# Patient Record
Sex: FEMALE | URBAN - METROPOLITAN AREA
[De-identification: names, ages, dates, MRNs, and addresses within clinical notes are randomized per-mention and may not be internally consistent; named-entity substitution may affect disease eponyms.]

---

## 2022-05-31 ENCOUNTER — APPOINTMENT (OUTPATIENT)
Dept: URBAN - METROPOLITAN AREA CLINIC 260 | Age: 67
Setting detail: DERMATOLOGY
End: 2022-06-01

## 2022-05-31 VITALS — WEIGHT: 180 LBS | HEIGHT: 67 IN

## 2022-05-31 DIAGNOSIS — L82.1 OTHER SEBORRHEIC KERATOSIS: ICD-10-CM

## 2022-05-31 DIAGNOSIS — D18.0 HEMANGIOMA: ICD-10-CM

## 2022-05-31 PROBLEM — D18.01 HEMANGIOMA OF SKIN AND SUBCUTANEOUS TISSUE: Status: ACTIVE | Noted: 2022-05-31

## 2022-05-31 PROCEDURE — 99202 OFFICE O/P NEW SF 15 MIN: CPT

## 2022-05-31 PROCEDURE — OTHER MIPS QUALITY: OTHER

## 2022-05-31 PROCEDURE — OTHER COUNSELING: OTHER

## 2022-05-31 ASSESSMENT — LOCATION SIMPLE DESCRIPTION DERM
LOCATION SIMPLE: RIGHT ANTERIOR NECK
LOCATION SIMPLE: LEFT FOREARM

## 2022-05-31 ASSESSMENT — LOCATION ZONE DERM
LOCATION ZONE: ARM
LOCATION ZONE: NECK

## 2022-05-31 ASSESSMENT — LOCATION DETAILED DESCRIPTION DERM
LOCATION DETAILED: LEFT VENTRAL PROXIMAL FOREARM
LOCATION DETAILED: RIGHT SUPERIOR LATERAL NECK

## 2022-07-12 ENCOUNTER — APPOINTMENT (OUTPATIENT)
Dept: URBAN - METROPOLITAN AREA CLINIC 260 | Age: 67
Setting detail: DERMATOLOGY
End: 2022-07-12

## 2022-07-12 VITALS — HEIGHT: 67 IN | WEIGHT: 180 LBS

## 2022-07-12 DIAGNOSIS — L40.0 PSORIASIS VULGARIS: ICD-10-CM

## 2022-07-12 DIAGNOSIS — L82.1 OTHER SEBORRHEIC KERATOSIS: ICD-10-CM

## 2022-07-12 DIAGNOSIS — D22 MELANOCYTIC NEVI: ICD-10-CM

## 2022-07-12 DIAGNOSIS — L81.4 OTHER MELANIN HYPERPIGMENTATION: ICD-10-CM

## 2022-07-12 DIAGNOSIS — D18.0 HEMANGIOMA: ICD-10-CM

## 2022-07-12 DIAGNOSIS — L72.8 OTHER FOLLICULAR CYSTS OF THE SKIN AND SUBCUTANEOUS TISSUE: ICD-10-CM

## 2022-07-12 DIAGNOSIS — Z71.89 OTHER SPECIFIED COUNSELING: ICD-10-CM

## 2022-07-12 PROBLEM — D18.01 HEMANGIOMA OF SKIN AND SUBCUTANEOUS TISSUE: Status: ACTIVE | Noted: 2022-07-12

## 2022-07-12 PROBLEM — D22.5 MELANOCYTIC NEVI OF TRUNK: Status: ACTIVE | Noted: 2022-07-12

## 2022-07-12 PROCEDURE — OTHER ADDITIONAL NOTES: OTHER

## 2022-07-12 PROCEDURE — 99214 OFFICE O/P EST MOD 30 MIN: CPT

## 2022-07-12 PROCEDURE — OTHER MIPS QUALITY: OTHER

## 2022-07-12 PROCEDURE — OTHER PRESCRIPTION MEDICATION MANAGEMENT: OTHER

## 2022-07-12 PROCEDURE — OTHER COUNSELING: OTHER

## 2022-07-12 ASSESSMENT — PGA PSORIASIS: PGA PSORIASIS 2020: MILD

## 2022-07-12 ASSESSMENT — LOCATION ZONE DERM
LOCATION ZONE: NECK
LOCATION ZONE: LEG
LOCATION ZONE: TRUNK

## 2022-07-12 ASSESSMENT — LOCATION SIMPLE DESCRIPTION DERM
LOCATION SIMPLE: RIGHT ANTERIOR NECK
LOCATION SIMPLE: RIGHT PRETIBIAL REGION
LOCATION SIMPLE: UPPER BACK
LOCATION SIMPLE: LEFT POPLITEAL SKIN

## 2022-07-12 ASSESSMENT — LOCATION DETAILED DESCRIPTION DERM
LOCATION DETAILED: LEFT POPLITEAL SKIN
LOCATION DETAILED: RIGHT PROXIMAL PRETIBIAL REGION
LOCATION DETAILED: RIGHT SUPERIOR LATERAL NECK
LOCATION DETAILED: INFERIOR THORACIC SPINE

## 2022-07-12 ASSESSMENT — BSA PSORIASIS: % BODY COVERED IN PSORIASIS: 6

## 2022-07-12 NOTE — PROCEDURE: PRESCRIPTION MEDICATION MANAGEMENT
Plan: Patient has small patches throughout her arms and legs that come and go. They mostly resolve with Triamcinolone. If she has any stubborn lesions, we could prescribe something stronger such as Clobetasol. Return if not improved.
Render In Strict Bullet Format?: No
Detail Level: Simple
Continue Regimen: Triamcinolone

## 2022-07-12 NOTE — HPI: FULL BODY SKIN EXAMINATION
How Severe Are Your Spot(S)?: moderate
What Type Of Note Output Would You Prefer (Optional)?: Bullet Format
What Is The Reason For Today's Visit?: Annual Full Body Skin Examination with No Concerns
What Is The Reason For Today's Visit? (Being Monitored For X): concerning skin lesions on an annual basis
Additional History: Taking remicaid for crohns for the past 7 years

## 2022-07-12 NOTE — PROCEDURE: ADDITIONAL NOTES
Additional Notes: If any lesion becomes bothersome it can be treated with freezing
Render Risk Assessment In Note?: no
Detail Level: Zone

## 2024-06-26 ENCOUNTER — TRANSFERRED RECORDS (OUTPATIENT)
Dept: HEALTH INFORMATION MANAGEMENT | Facility: CLINIC | Age: 69
End: 2024-06-26

## 2024-06-26 LAB
CHOLESTEROL (EXTERNAL): 166 MG/DL (ref 0–200)
CREATININE (EXTERNAL): 0.6 MG/DL (ref 0.7–1.4)
GLUCOSE (EXTERNAL): 104 MG/DL (ref 60–99)
HBA1C MFR BLD: 5.29 % (ref 4–5.6)
HDLC SERPL-MCNC: 83 MG/DL (ref 35–65)
LDL CHOLESTEROL CALCULATED (EXTERNAL): 4 MG/DL (ref 0–130)
POTASSIUM (EXTERNAL): 4.4 MMOL/L (ref 3.5–5.1)
TRIGLYCERIDES (EXTERNAL): 399 MG/DL (ref 0–200)

## 2024-08-15 ENCOUNTER — TRANSFERRED RECORDS (OUTPATIENT)
Dept: HEALTH INFORMATION MANAGEMENT | Facility: CLINIC | Age: 69
End: 2024-08-15
Payer: MEDICARE

## 2024-08-15 ENCOUNTER — MEDICAL CORRESPONDENCE (OUTPATIENT)
Dept: HEALTH INFORMATION MANAGEMENT | Facility: CLINIC | Age: 69
End: 2024-08-15
Payer: MEDICARE

## 2024-09-05 ENCOUNTER — ANCILLARY PROCEDURE (OUTPATIENT)
Dept: CARDIOLOGY | Facility: CLINIC | Age: 69
End: 2024-09-05
Attending: FAMILY MEDICINE
Payer: COMMERCIAL

## 2024-09-05 DIAGNOSIS — R01.1 HEART MURMUR: ICD-10-CM

## 2024-09-05 LAB — BI-PLANE LVEF ECHO: NORMAL

## 2024-09-06 ENCOUNTER — OFFICE VISIT (OUTPATIENT)
Dept: CARDIOLOGY | Facility: CLINIC | Age: 69
End: 2024-09-06
Payer: MEDICARE

## 2024-09-06 VITALS
DIASTOLIC BLOOD PRESSURE: 73 MMHG | WEIGHT: 193 LBS | SYSTOLIC BLOOD PRESSURE: 168 MMHG | HEIGHT: 67 IN | HEART RATE: 77 BPM | BODY MASS INDEX: 30.29 KG/M2 | RESPIRATION RATE: 16 BRPM

## 2024-09-06 DIAGNOSIS — I10 ESSENTIAL HYPERTENSION: ICD-10-CM

## 2024-09-06 DIAGNOSIS — E78.2 MIXED HYPERLIPIDEMIA: ICD-10-CM

## 2024-09-06 DIAGNOSIS — R01.1 HEART MURMUR: Primary | ICD-10-CM

## 2024-09-06 DIAGNOSIS — R06.09 DOE (DYSPNEA ON EXERTION): ICD-10-CM

## 2024-09-06 PROCEDURE — 99204 OFFICE O/P NEW MOD 45 MIN: CPT | Performed by: INTERNAL MEDICINE

## 2024-09-06 RX ORDER — LOSARTAN POTASSIUM 25 MG/1
12.5 TABLET ORAL DAILY
Qty: 30 TABLET | Refills: 3 | Status: SHIPPED | OUTPATIENT
Start: 2024-09-06

## 2024-09-06 RX ORDER — CALCIUM CARBONATE 500 MG/1
1 TABLET, CHEWABLE ORAL DAILY
COMMUNITY

## 2024-09-06 RX ORDER — ATORVASTATIN CALCIUM 20 MG/1
20 TABLET, FILM COATED ORAL DAILY
COMMUNITY

## 2024-09-06 NOTE — PROGRESS NOTES
Thank you, Dr. Anuj Levine, for asking the [facility] team to see Ms. Alexi Crow to follow-up on heart murmur, dyspnea on exertion.    Assessment/Recommendations   Assessment:    1.  Heart murmur, likely due to trace mitral, tricuspid and aortic insufficiency noted on recent echocardiogram.  At this point in time, no further treatment indicated.  I also told her this would not be a source of her symptoms of exertional dyspnea.  2.  Dyspnea on exertion, etiology unclear.  Could be due to poorly controlled hypertension as her blood pressure is quite elevated today despite maximal dose of metoprolol succinate.  Recommended adding a low-dose of vasodilator to see if this helps.  Also have recommended exercise stress study to evaluate for ischemia as another potential etiology.  The only other question would be chronotropic incompetence as she is on maximal dose of metoprolol succinate although her heart rates are not in the low 60s at today's visit.  3.  Mixed hyperlipidemia.  She is on atorvastatin 20 mg daily with her most recent lipid profile demonstrating an LDL of 4.  Her LDL does not need to be this low and I suggested she decrease her atorvastatin to 10 mg daily with plans to have repeat lipids done in 3 months.  Her triglycerides were markedly elevated although she informs me she was not fasting at that visit.  Told her to be certain she is fasting for the next lipid draw.  4.  Essential hypertension, inadequately controlled.  Will add vasodilator therapy to her regimen which could be titrated upwards if needed.    Plan:  1.  Decrease atorvastatin to 10 mg daily with plan for repeat lipids in 3 months through primary care  2.  Add losartan 12.5 mg daily to current dose of metoprolol succinate  3.  Pursue stress echo study with further recommendations to follow       History of Present Illness    Ms. Alexi Crow is a 68 year old female with history of essential hypertension,  "hyperlipidemia who recently underwent colonoscopy and was noted to have a heart murmur by anesthesiology.  This information was subsequently communicated to her primary care provider who has since ordered an echocardiogram demonstrating no hemodynamically significant valve disease.  She did report symptoms of exertional dyspnea when climbing a slight hill as she walks her dog.  Denies any clear associated chest discomfort although has had occasional episodes of chest discomfort which are not clearly activity related.  No history of orthopnea, PND or lower extremity edema.  Denies palpitations or lightheadedness.    ECG (personally reviewed): No ECG today.  Previous ECG in July demonstrated sinus rhythm with nonspecific ST abnormality in the lateral leads    Cardiac Imaging Studies (personally reviewed):    Procedure  Complete Echo Adult. Definity (NDC #09602-177) given intravenously. 3ml given  and 7ml wasted  Lot# [id number] Exp Date Feb 2025. Technically difficult study. There is no  comparison study available. No hemodynamically significant valvular  abnormalities on 2D or color flow imaging.  ______________________________________________________________________________  Interpretation Summary     The left ventricle is normal in size.  Biplane LVEF is 68%.  No regional wall motion abnormalities noted.  No hemodynamically significant valvular abnormalities on 2D or color flow  imaging.  There is no comparison study available.  ___________________________________     Physical Examination Review of Systems   BP (!) 168/73 (BP Location: Left arm, Patient Position: Sitting, Cuff Size: Adult Regular)   Pulse 77   Resp 16   Ht 1.702 m (5' 7\")   Wt 87.5 kg (193 lb)   BMI 30.23 kg/m    Body mass index is 30.23 kg/m .  Wt Readings from Last 3 Encounters:   09/06/24 87.5 kg (193 lb)   05/26/16 83.9 kg (185 lb)     General Appearance:   Awake, Alert, No acute distress.   HEENT:  No scleral icterus; the mucous membranes were " "pink and moist.   Neck: No cervical bruits or jugular venous distention    Chest: The spine was straight. The chest was symmetric.   Lungs:   Respirations unlabored; the lungs are clear to auscultation. No wheezing   Cardiovascular:   Regular rate and rhythm.  S1, S2 normal.  1/6 systolic ejection murmur heard at the left upper sternal border with 1/6 diastolic decrescendo murmur also noted.   Abdomen:  No organomegaly, masses, bruits, or tenderness. Bowels sounds are present   Extremities: No peripheral edema bilaterally   Skin: No xanthelasma. Warm, Dry.   Musculoskeletal: No tenderness.   Neurologic: Mood and affect are appropriate.    Enc Vitals  BP: (!) 168/73  Pulse: 77  Resp: 16  Weight: 87.5 kg (193 lb)  Height: 170.2 cm (5' 7\")                                         Medical History  Surgical History Family History Social History   Past Medical History:   Diagnosis Date    Hyperlipidemia     Hypertension     Past Surgical History:   Procedure Laterality Date    ARTHROSCOPY ANKLE      VAGINAL WOUND CLOSURE / REPAIR      Family History   Problem Relation Age of Onset    Coronary Artery Disease Mother     Coronary Artery Disease Father 61        CABG    Social History     Socioeconomic History    Marital status:      Spouse name: Not on file    Number of children: Not on file    Years of education: Not on file    Highest education level: Not on file   Occupational History    Not on file   Tobacco Use    Smoking status: Former     Current packs/day: 0.00     Types: Cigarettes     Quit date: 1973     Years since quittin.2    Smokeless tobacco: Not on file   Substance and Sexual Activity    Alcohol use: Yes     Alcohol/week: 1.0 standard drink of alcohol     Comment: Alcoholic Drinks/day: daily    Drug use: No    Sexual activity: Not on file   Other Topics Concern    Not on file   Social History Narrative    Not on file     Social Determinants of Health     Financial Resource Strain: Not on file "   Food Insecurity: Not on file   Transportation Needs: Not on file   Physical Activity: Not on file   Stress: Not on file   Social Connections: Not on file   Interpersonal Safety: Not on file   Housing Stability: Not on file          Medications  Allergies   Current Outpatient Medications   Medication Sig Dispense Refill    acetaminophen (TYLENOL) 500 MG tablet [ACETAMINOPHEN (TYLENOL) 500 MG TABLET] Take 1,000 mg by mouth every 6 (six) hours as needed for pain.      atorvastatin (LIPITOR) 20 MG tablet Take 20 mg by mouth daily.      calcium carbonate (TUMS) 500 MG chewable tablet Take 1 chew tab by mouth daily.      cetirizine (ZYRTEC) 10 MG tablet [CETIRIZINE (ZYRTEC) 10 MG TABLET] Take 10 mg by mouth daily as needed for allergies.      INFLIXIMAB (REMICADE IV) [INFLIXIMAB (REMICADE IV)] Infuse 400 mg into a venous catheter every 2 (two) months.      loperamide (IMODIUM A-D) 2 mg tablet [LOPERAMIDE (IMODIUM A-D) 2 MG TABLET] Take 2 mg by mouth 4 (four) times a day as needed for diarrhea.      losartan (COZAAR) 25 MG tablet Take 0.5 tablets (12.5 mg) by mouth daily. 30 tablet 3    metoprolol succinate (TOPROL-XL) 50 MG 24 hr tablet Take 200 mg by mouth every evening.      omeprazole (PRILOSEC) 20 MG capsule [OMEPRAZOLE (PRILOSEC) 20 MG CAPSULE] Take 20 mg by mouth daily as needed.      triamcinolone (KENALOG) 0.5 % cream [TRIAMCINOLONE (KENALOG) 0.5 % CREAM] Apply 1 application topically 2 (two) times a day as needed.      aspirin 81 MG EC tablet [ASPIRIN 81 MG EC TABLET] Take 81 mg by mouth daily. (Patient not taking: Reported on 9/6/2024)      hydrOXYzine (VISTARIL) 25 MG capsule [HYDROXYZINE (VISTARIL) 25 MG CAPSULE] 1-2 po q 4-6 hrs prn (Patient not taking: Reported on 9/6/2024) 45 capsule 0    LORazepam (ATIVAN) 1 MG tablet [LORAZEPAM (ATIVAN) 1 MG TABLET] Take 1 mg by mouth 3 (three) times a day as needed (anxiety associated with flying). (Patient not taking: Reported on 9/6/2024)      MEDICATION CANNOT BE  "REORDERED - PLEASE MANUALLY REORDER AND DISCONTINUE THE OLD ORDER [DOCOSHEXANOIC ACID-EICOSAPENT 500 MG (FISH OIL) 500-100 MG CAP CAPSULE] Take 500 mg by mouth daily. (Patient not taking: Reported on 9/6/2024)        No Known Allergies      Lab Results    Chemistry/lipid CBC Cardiac Enzymes/BNP/TSH/INR   Recent Labs   Lab Test 06/26/24  1102   TRIG 399*    No results for input(s): \"WBC\", \"HGB\", \"HCT\", \"MCV\", \"PLT\" in the last 45424 hours. No results for input(s): \"CKTOTAL\", \"CKMB\", \"TROPONINI\", \"BNP\", \"TSH\", \"INR\" in the last 34772 hours.    Invalid input(s): \"CKMBINDEX\"                        "

## 2024-09-06 NOTE — LETTER
9/6/2024    Anuj Levine MD  [facility] [address] [place] [id number]    RE: Alexi Crow       Dear Colleague,     I had the pleasure of seeing Alexi Crow in the [facility].      Thank you, Dr. Anuj Levine, for asking the [facility] team to see Ms. Alexi Crow to follow-up on heart murmur, dyspnea on exertion.    Assessment/Recommendations   Assessment:    1.  Heart murmur, likely due to trace mitral, tricuspid and aortic insufficiency noted on recent echocardiogram.  At this point in time, no further treatment indicated.  I also told her this would not be a source of her symptoms of exertional dyspnea.  2.  Dyspnea on exertion, etiology unclear.  Could be due to poorly controlled hypertension as her blood pressure is quite elevated today despite maximal dose of metoprolol succinate.  Recommended adding a low-dose of vasodilator to see if this helps.  Also have recommended exercise stress study to evaluate for ischemia as another potential etiology.  The only other question would be chronotropic incompetence as she is on maximal dose of metoprolol succinate although her heart rates are not in the low 60s at today's visit.  3.  Mixed hyperlipidemia.  She is on atorvastatin 20 mg daily with her most recent lipid profile demonstrating an LDL of 4.  Her LDL does not need to be this low and I suggested she decrease her atorvastatin to 10 mg daily with plans to have repeat lipids done in 3 months.  Her triglycerides were markedly elevated although she informs me she was not fasting at that visit.  Told her to be certain she is fasting for the next lipid draw.  4.  Essential hypertension, inadequately controlled.  Will add vasodilator therapy to her regimen which could be titrated upwards if needed.    Plan:  1.  Decrease atorvastatin to 10 mg daily with plan for repeat lipids in 3 months through primary care  2.  Add losartan 12.5 mg daily to current  "dose of metoprolol succinate  3.  Pursue stress echo study with further recommendations to follow       History of Present Illness    Ms. Alexi Crow is a 68 year old female with history of essential hypertension, hyperlipidemia who recently underwent colonoscopy and was noted to have a heart murmur by anesthesiology.  This information was subsequently communicated to her primary care provider who has since ordered an echocardiogram demonstrating no hemodynamically significant valve disease.  She did report symptoms of exertional dyspnea when climbing a slight hill as she walks her dog.  Denies any clear associated chest discomfort although has had occasional episodes of chest discomfort which are not clearly activity related.  No history of orthopnea, PND or lower extremity edema.  Denies palpitations or lightheadedness.    ECG (personally reviewed): No ECG today.  Previous ECG in July demonstrated sinus rhythm with nonspecific ST abnormality in the lateral leads    Cardiac Imaging Studies (personally reviewed):    Procedure  Complete Echo Adult. Definity (NDC #02209-390) given intravenously. 3ml given  and 7ml wasted  Lot# [id number] Exp Date Feb 2025. Technically difficult study. There is no  comparison study available. No hemodynamically significant valvular  abnormalities on 2D or color flow imaging.  ______________________________________________________________________________  Interpretation Summary     The left ventricle is normal in size.  Biplane LVEF is 68%.  No regional wall motion abnormalities noted.  No hemodynamically significant valvular abnormalities on 2D or color flow  imaging.  There is no comparison study available.  ___________________________________     Physical Examination Review of Systems   BP (!) 168/73 (BP Location: Left arm, Patient Position: Sitting, Cuff Size: Adult Regular)   Pulse 77   Resp 16   Ht 1.702 m (5' 7\")   Wt 87.5 kg (193 lb)   BMI 30.23 kg/m    Body mass index is " "30.23 kg/m .  Wt Readings from Last 3 Encounters:   24 87.5 kg (193 lb)   16 83.9 kg (185 lb)     General Appearance:   Awake, Alert, No acute distress.   HEENT:  No scleral icterus; the mucous membranes were pink and moist.   Neck: No cervical bruits or jugular venous distention    Chest: The spine was straight. The chest was symmetric.   Lungs:   Respirations unlabored; the lungs are clear to auscultation. No wheezing   Cardiovascular:   Regular rate and rhythm.  S1, S2 normal.  1/6 systolic ejection murmur heard at the left upper sternal border with 1/6 diastolic decrescendo murmur also noted.   Abdomen:  No organomegaly, masses, bruits, or tenderness. Bowels sounds are present   Extremities: No peripheral edema bilaterally   Skin: No xanthelasma. Warm, Dry.   Musculoskeletal: No tenderness.   Neurologic: Mood and affect are appropriate.    Enc Vitals  BP: (!) 168/73  Pulse: 77  Resp: 16  Weight: 87.5 kg (193 lb)  Height: 170.2 cm (5' 7\")                                         Medical History  Surgical History Family History Social History   Past Medical History:   Diagnosis Date     Hyperlipidemia      Hypertension     Past Surgical History:   Procedure Laterality Date     ARTHROSCOPY ANKLE       VAGINAL WOUND CLOSURE / REPAIR      Family History   Problem Relation Age of Onset     Coronary Artery Disease Mother      Coronary Artery Disease Father 61        CABG    Social History     Socioeconomic History     Marital status:      Spouse name: Not on file     Number of children: Not on file     Years of education: Not on file     Highest education level: Not on file   Occupational History     Not on file   Tobacco Use     Smoking status: Former     Current packs/day: 0.00     Types: Cigarettes     Quit date: 1973     Years since quittin.2     Smokeless tobacco: Not on file   Substance and Sexual Activity     Alcohol use: Yes     Alcohol/week: 1.0 standard drink of alcohol     Comment: " Alcoholic Drinks/day: daily     Drug use: No     Sexual activity: Not on file   Other Topics Concern     Not on file   Social History Narrative     Not on file     Social Determinants of Health     Financial Resource Strain: Not on file   Food Insecurity: Not on file   Transportation Needs: Not on file   Physical Activity: Not on file   Stress: Not on file   Social Connections: Not on file   Interpersonal Safety: Not on file   Housing Stability: Not on file          Medications  Allergies   Current Outpatient Medications   Medication Sig Dispense Refill     acetaminophen (TYLENOL) 500 MG tablet [ACETAMINOPHEN (TYLENOL) 500 MG TABLET] Take 1,000 mg by mouth every 6 (six) hours as needed for pain.       atorvastatin (LIPITOR) 20 MG tablet Take 20 mg by mouth daily.       calcium carbonate (TUMS) 500 MG chewable tablet Take 1 chew tab by mouth daily.       cetirizine (ZYRTEC) 10 MG tablet [CETIRIZINE (ZYRTEC) 10 MG TABLET] Take 10 mg by mouth daily as needed for allergies.       INFLIXIMAB (REMICADE IV) [INFLIXIMAB (REMICADE IV)] Infuse 400 mg into a venous catheter every 2 (two) months.       loperamide (IMODIUM A-D) 2 mg tablet [LOPERAMIDE (IMODIUM A-D) 2 MG TABLET] Take 2 mg by mouth 4 (four) times a day as needed for diarrhea.       losartan (COZAAR) 25 MG tablet Take 0.5 tablets (12.5 mg) by mouth daily. 30 tablet 3     metoprolol succinate (TOPROL-XL) 50 MG 24 hr tablet Take 200 mg by mouth every evening.       omeprazole (PRILOSEC) 20 MG capsule [OMEPRAZOLE (PRILOSEC) 20 MG CAPSULE] Take 20 mg by mouth daily as needed.       triamcinolone (KENALOG) 0.5 % cream [TRIAMCINOLONE (KENALOG) 0.5 % CREAM] Apply 1 application topically 2 (two) times a day as needed.       aspirin 81 MG EC tablet [ASPIRIN 81 MG EC TABLET] Take 81 mg by mouth daily. (Patient not taking: Reported on 9/6/2024)       hydrOXYzine (VISTARIL) 25 MG capsule [HYDROXYZINE (VISTARIL) 25 MG CAPSULE] 1-2 po q 4-6 hrs prn (Patient not taking: Reported  "on 9/6/2024) 45 capsule 0     LORazepam (ATIVAN) 1 MG tablet [LORAZEPAM (ATIVAN) 1 MG TABLET] Take 1 mg by mouth 3 (three) times a day as needed (anxiety associated with flying). (Patient not taking: Reported on 9/6/2024)       MEDICATION CANNOT BE REORDERED - PLEASE MANUALLY REORDER AND DISCONTINUE THE OLD ORDER [DOCOSHEXANOIC ACID-EICOSAPENT 500 MG (FISH OIL) 500-100 MG CAP CAPSULE] Take 500 mg by mouth daily. (Patient not taking: Reported on 9/6/2024)        No Known Allergies      Lab Results    Chemistry/lipid CBC Cardiac Enzymes/BNP/TSH/INR   Recent Labs   Lab Test 06/26/24  1102   TRIG 399*    No results for input(s): \"WBC\", \"HGB\", \"HCT\", \"MCV\", \"PLT\" in the last 13958 hours. No results for input(s): \"CKTOTAL\", \"CKMB\", \"TROPONINI\", \"BNP\", \"TSH\", \"INR\" in the last 87094 hours.    Invalid input(s): \"CKMBINDEX\"                          Thank you for allowing me to participate in the care of your patient.      Sincerely,     Lizzie Olmos MD     [facility]  cc:   Anuj Levine MD  [facility]  [address] [place] [address]      "

## 2024-09-06 NOTE — PATIENT INSTRUCTIONS
Decrease Atorvastatin to 10 mg or half tablet daily as your LDL was 4. Will want to ask PCP to recheck in 3 months. The goal is to keep your LDL below 70.  Add Losartan 12.5 mg daily to your current regimen to try to keep your systolic -125 mmHg range.  Set up stress echo to look for any issues with blood flow to the heart muscle. We will contact you with results. Would hold Metoprolol for 24 hours so heart rate increases sufficiently.

## 2024-09-29 ENCOUNTER — HEALTH MAINTENANCE LETTER (OUTPATIENT)
Age: 69
End: 2024-09-29

## 2024-10-08 ENCOUNTER — HOSPITAL ENCOUNTER (OUTPATIENT)
Dept: CARDIOLOGY | Facility: CLINIC | Age: 69
Discharge: HOME | End: 2024-10-08
Attending: INTERNAL MEDICINE | Admitting: INTERNAL MEDICINE
Payer: MEDICARE

## 2024-10-08 DIAGNOSIS — R06.09 DOE (DYSPNEA ON EXERTION): ICD-10-CM

## 2024-10-08 LAB
CV STRESS CURRENT BP HE: NORMAL
CV STRESS CURRENT HR HE: 103
CV STRESS CURRENT HR HE: 108
CV STRESS CURRENT HR HE: 111
CV STRESS CURRENT HR HE: 111
CV STRESS CURRENT HR HE: 113
CV STRESS CURRENT HR HE: 114
CV STRESS CURRENT HR HE: 122
CV STRESS CURRENT HR HE: 133
CV STRESS CURRENT HR HE: 135
CV STRESS CURRENT HR HE: 135
CV STRESS CURRENT HR HE: 137
CV STRESS CURRENT HR HE: 137
CV STRESS CURRENT HR HE: 138
CV STRESS CURRENT HR HE: 142
CV STRESS CURRENT HR HE: 69
CV STRESS CURRENT HR HE: 78
CV STRESS CURRENT HR HE: 79
CV STRESS CURRENT HR HE: 79
CV STRESS CURRENT HR HE: 82
CV STRESS CURRENT HR HE: 84
CV STRESS CURRENT HR HE: 85
CV STRESS CURRENT HR HE: 90
CV STRESS CURRENT HR HE: 97
CV STRESS CURRENT HR HE: 98
CV STRESS DEVIATION TIME HE: NORMAL
CV STRESS ECHO PERCENT HR HE: NORMAL
CV STRESS EXERCISE STAGE HE: NORMAL
CV STRESS EXERCISE STAGE REACHED HE: NORMAL
CV STRESS FINAL RESTING BP HE: NORMAL
CV STRESS FINAL RESTING HR HE: 78
CV STRESS MAX HR HE: 148
CV STRESS MAX TREADMILL GRADE HE: 14
CV STRESS MAX TREADMILL SPEED HE: 3.4
CV STRESS PEAK DIA BP HE: NORMAL
CV STRESS PEAK SYS BP HE: NORMAL
CV STRESS PHASE HE: NORMAL
CV STRESS PROTOCOL HE: NORMAL
CV STRESS REASON STOPPED HE: NORMAL
CV STRESS RESTING PT POSITION HE: NORMAL
CV STRESS RESTING PT POSITION HE: NORMAL
CV STRESS ST DEVIATION AMOUNT HE: NORMAL
CV STRESS ST DEVIATION ELEVATION HE: NORMAL
CV STRESS ST EVELATION AMOUNT HE: NORMAL
CV STRESS SYMPTOMS HE: NORMAL
CV STRESS TEST TYPE HE: NORMAL
CV STRESS TOTAL STAGE TIME MIN 1 HE: NORMAL
STRESS ECHO BASELINE DIASTOLIC HE: 83
STRESS ECHO BASELINE HR: 69
STRESS ECHO BASELINE SYSTOLIC BP: 154
STRESS ECHO LAST STRESS DIASTOLIC BP: 72
STRESS ECHO LAST STRESS HR: 113
STRESS ECHO LAST STRESS SYSTOLIC BP: 170
STRESS ECHO POST ESTIMATED WORKLOAD: 8
STRESS ECHO POST EXERCISE DUR MIN: 6
STRESS ECHO POST EXERCISE DUR SEC: 30
STRESS ECHO TARGET HR: 129

## 2024-10-08 PROCEDURE — 93321 DOPPLER ECHO F-UP/LMTD STD: CPT | Mod: 26 | Performed by: INTERNAL MEDICINE

## 2024-10-08 PROCEDURE — 999N000208 ECHO STRESS ECHOCARDIOGRAM

## 2024-10-08 PROCEDURE — 93016 CV STRESS TEST SUPVJ ONLY: CPT | Performed by: INTERNAL MEDICINE

## 2024-10-08 PROCEDURE — 93017 CV STRESS TEST TRACING ONLY: CPT

## 2024-10-08 PROCEDURE — 255N000002 HC RX 255 OP 636: Performed by: INTERNAL MEDICINE

## 2024-10-08 PROCEDURE — 93325 DOPPLER ECHO COLOR FLOW MAPG: CPT | Mod: 26 | Performed by: INTERNAL MEDICINE

## 2024-10-08 PROCEDURE — 93350 STRESS TTE ONLY: CPT | Mod: 26 | Performed by: INTERNAL MEDICINE

## 2024-10-08 PROCEDURE — 93018 CV STRESS TEST I&R ONLY: CPT | Performed by: INTERNAL MEDICINE

## 2024-10-08 PROCEDURE — 93352 ADMIN ECG CONTRAST AGENT: CPT | Performed by: INTERNAL MEDICINE

## 2024-10-08 RX ADMIN — PERFLUTREN 2 ML: 6.52 INJECTION, SUSPENSION INTRAVENOUS at 09:00

## 2024-11-26 DIAGNOSIS — R06.09 DOE (DYSPNEA ON EXERTION): ICD-10-CM

## 2024-11-26 RX ORDER — LOSARTAN POTASSIUM 25 MG/1
12.5 TABLET ORAL DAILY
Qty: 45 TABLET | Refills: 0 | Status: SHIPPED | OUTPATIENT
Start: 2024-11-26

## 2025-02-27 DIAGNOSIS — R06.09 DOE (DYSPNEA ON EXERTION): ICD-10-CM

## 2025-02-27 RX ORDER — LOSARTAN POTASSIUM 25 MG/1
12.5 TABLET ORAL DAILY
Qty: 45 TABLET | Refills: 3 | Status: SHIPPED | OUTPATIENT
Start: 2025-02-27